# Patient Record
Sex: MALE | Race: BLACK OR AFRICAN AMERICAN
[De-identification: names, ages, dates, MRNs, and addresses within clinical notes are randomized per-mention and may not be internally consistent; named-entity substitution may affect disease eponyms.]

---

## 2019-03-13 ENCOUNTER — HOSPITAL ENCOUNTER (OUTPATIENT)
Dept: HOSPITAL 92 - CT | Age: 39
Discharge: HOME | End: 2019-03-13
Attending: FAMILY MEDICINE
Payer: COMMERCIAL

## 2019-03-13 DIAGNOSIS — K42.9: Primary | ICD-10-CM

## 2019-03-13 DIAGNOSIS — K57.30: ICD-10-CM

## 2019-03-13 PROCEDURE — 74178 CT ABD&PLV WO CNTR FLWD CNTR: CPT

## 2019-03-13 NOTE — CT
CT ABDOMEN AND PELVIS WITH AND WITHOUT IV CONTARST:

 

HISTORY: 

Umbilical hernia without obstruction or gangrene.

 

FINDINGS: 

The lung bases are clear, except for a calcified granuloma in the right middle lobe.  No calcified ga
llstones are seen.  A tiny low-density lesion is seen in the right lobe of the liver close to the dom
e, too small to characterize but statistically likely to represent a cyst.  The spleen, pancreas, adr
enal glands, and right kidney are normal.  There is a 1 cm well circumscribed low-density lesion in t
he left posterior renal cortex, likely cyst.  No calculi are seen in the kidney, ureters, or the urin
gi bladder.  No hydroureteral nephrosis is noted on either side.

 

No free air, free fluid, or lymphadenopathy is seen in the abdomen or pelvis.  The small bowel loops 
are not abnormally dilated.  There is sigmoid diverticulosis without diverticulitis.  There is thicke
javon of the wall of the colon (predominantly transverse colon).  The tiny fat-containing umbilical he
rnia is noted.  There is no evidence of aneurysmal dilatation of the abdominal aorta.  No acute osseo
us abnormalities are seen.

 

IMPRESSION: 

1.  Probable small left renal cyst.

 

2.  Sigmoid diverticulosis.

 

3.  Tiny fat-containing umbilical hernia.

 

4.  Prominence of the wall of the colon (predominantly transverse colon).  Incomplete distention vers
us wall thickening.  Colonoscopy would be helpful.

 

POS: [facility]